# Patient Record
Sex: MALE | Race: WHITE | Employment: FULL TIME | URBAN - METROPOLITAN AREA
[De-identification: names, ages, dates, MRNs, and addresses within clinical notes are randomized per-mention and may not be internally consistent; named-entity substitution may affect disease eponyms.]

---

## 2020-12-27 ENCOUNTER — HOSPITAL ENCOUNTER (EMERGENCY)
Age: 45
Discharge: HOME | End: 2020-12-27
Attending: EMERGENCY MEDICINE

## 2020-12-27 ENCOUNTER — APPOINTMENT (OUTPATIENT)
Dept: GENERAL RADIOLOGY | Age: 45
End: 2020-12-27
Attending: EMERGENCY MEDICINE

## 2020-12-27 VITALS
RESPIRATION RATE: 18 BRPM | TEMPERATURE: 98.7 F | HEART RATE: 70 BPM | OXYGEN SATURATION: 99 % | SYSTOLIC BLOOD PRESSURE: 124 MMHG | DIASTOLIC BLOOD PRESSURE: 86 MMHG
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
## 2020-12-27 DIAGNOSIS — M25.561 ACUTE PAIN OF BOTH KNEES: Primary | ICD-10-CM

## 2020-12-27 DIAGNOSIS — M25.562 ACUTE PAIN OF BOTH KNEES: Primary | ICD-10-CM

## 2020-12-27 PROCEDURE — 73562 X-RAY EXAM OF KNEE 3: CPT

## 2020-12-27 PROCEDURE — 99282 EMERGENCY DEPT VISIT SF MDM: CPT

## 2020-12-27 RX ORDER — METHOCARBAMOL 750 MG/1
750 TABLET, FILM COATED ORAL
Qty: 15 TAB | Refills: 0 | Status: SHIPPED | OUTPATIENT
Start: 2020-12-27

## 2020-12-27 RX ORDER — OXYCODONE HYDROCHLORIDE 5 MG/1
5 TABLET ORAL
Qty: 6 TAB | Refills: 0 | Status: SHIPPED | OUTPATIENT
Start: 2020-12-27 | End: 2020-12-30

## 2020-12-28 NOTE — ED NOTES
Patient was provided with discharge instructions. Instructions and any medications were reviewed with the patient &/or family by Dr Katia Simms. Questions and concerns addressed by the provider. Patient discharged in stable condition via Amy, RN and walked out of the ED.

## 2020-12-28 NOTE — ED PROVIDER NOTES
EMERGENCY DEPARTMENT HISTORY AND PHYSICAL EXAM      Date: 12/27/2020  Patient Name: Trace Wheat III    History of Presenting Illness     Chief Complaint   Patient presents with    Knee Pain     Pt arrives via EMS c/o bilateral knee pain after jumping on a trampoline. Felt like the joints weren't in place and when he stood up, they popped back in. History Provided By: Patient    HPI: Callie Gerber, 41 y.o. male presents to the ED with cc of bilateral knee pain. Patient is a 78-year-old male with no significant past medical history he reports he was in his normal state of health, and was jumping on a trampoline. He reports he landed and both of his knees went out to the side. Reports he felt a \"pop\" and was able to straighten his legs after feeling like they were stuck. He reports pain in his medial knees bilaterally. Worse left greater than right. Reports swelling as well. Patient denies any other injuries, did not strike his head or lose consciousness. Otherwise in normal state of health. There are no other complaints, changes, or physical findings at this time. PCP: Jewelene Collet, MD    No current facility-administered medications on file prior to encounter. No current outpatient medications on file prior to encounter. Past History     Past Medical History:  Denies    Past Surgical History:  Denies    Family History:  Non-contributory    Social History:    Denies tobacco, alcohol or drug use    Allergies:    No known drug allergies    Review of Systems   Review of Systems   Constitutional: Negative for fever. HENT: Negative for voice change. Eyes: Negative for pain and redness. Respiratory: Negative for cough and chest tightness. Cardiovascular: Negative for chest pain and leg swelling. Gastrointestinal: Negative for abdominal pain, diarrhea, nausea and vomiting. Genitourinary: Negative for hematuria.    Musculoskeletal: Positive for arthralgias, gait problem and joint swelling. Skin: Negative for color change, pallor and rash. Neurological: Negative for facial asymmetry, weakness and headaches. Hematological: Does not bruise/bleed easily. Psychiatric/Behavioral: Negative for behavioral problems. All other systems reviewed and are negative. Physical Exam   Physical Exam  Vitals signs and nursing note reviewed. Constitutional:       Comments: 29-year-old male, resting on stretcher, no acute distress   HENT:      Head: Normocephalic. Right Ear: External ear normal.      Left Ear: External ear normal.      Nose: Nose normal.   Eyes:      Conjunctiva/sclera: Conjunctivae normal.   Cardiovascular:      Rate and Rhythm: Normal rate and regular rhythm. Comments: Patient has 2+ DP and PT pulses in bilateral lower extremities  Pulmonary:      Effort: Pulmonary effort is normal.      Breath sounds: Normal breath sounds. Abdominal:      Palpations: Abdomen is soft. Musculoskeletal: Normal range of motion. Comments: LLE: Patient is able to flex his knee to approximately 30 degrees. There is swelling and effusion most pronounced over the left proximal knee. There is no obvious ligamentous instability. RLE: Patient is able to flex his knee to approximately 30 degrees. There is swelling along the medial aspect of the knee joint, less pronounced than the left. Skin:     General: Skin is warm. Capillary Refill: Capillary refill takes less than 2 seconds. Neurological:      Mental Status: He is alert. Mental status is at baseline. Psychiatric:         Mood and Affect: Mood normal.         Behavior: Behavior normal.         Diagnostic Study Results     Labs -   No results found for this or any previous visit (from the past 12 hour(s)). Radiologic Studies -   XR KNEE LT 3 V   Final Result   IMPRESSION: Trace effusion. No acute osseous abnormality.       XR KNEE RT 3 V   Final Result   IMPRESSION: No acute abnormality. CT Results  (Last 48 hours)    None        CXR Results  (Last 48 hours)    None          Medical Decision Making   I am the first provider for this patient. I reviewed the vital signs, available nursing notes, past medical history, past surgical history, family history and social history. Vital Signs-Reviewed the patient's vital signs. Patient Vitals for the past 12 hrs:   Temp Pulse Resp BP SpO2   12/27/20 2053 98.7 °F (37.1 °C) 70 18 124/86 99 %         Records Reviewed: Nursing Notes and Old Medical Records    Provider Notes (Medical Decision Making):     Previously healthy 71-year-old male presents after an injury at [facility]. Vitals are stable. Patient denies any other injuries. Differential for patient's knee pain includes fracture, patellar dislocation, quadriceps tear, meniscal injury. Patient denies that his tibia went posteriorly, given his vascular status, I doubt that this is a spontaneously reduced knee dislocation. We will check bilateral plain films. Patient is declining pain medications. Discussed that he will likely need Ortho follow-up. ED Course:   Initial assessment performed. The patients presenting problems have been discussed, and they are in agreement with the care plan formulated and outlined with them. I have encouraged them to ask questions as they arise throughout their visit. ED Course as of Dec 27 2214   Sun Dec 27, 2020   2200 On further exam, of the left knee there is tenderness to palpation most pronounced along the medial quadriceps tendon with effusion. X-rays negative for fracture pressure transfusion. The right knee shows a defect along the medial aspect with some bruising over the left quadriceps muscle. Patient is neurovascular intact. Plain film without fracture. With extension patient has difficulty 2/2 pain.     [MB]      ED Course User Index  [MB] MD Feliciano Izaguirre, MD      Disposition:    Reassessments as above. Labs and ED course reviewed. Patient was discharged home and was provided strict return precautions. Patient to follow-up with PCP or specialist per discharge instructions or return to ED for worsening symptoms. DISCHARGE PLAN:  1. Current Discharge Medication List      START taking these medications    Details   oxyCODONE IR (Roxicodone) 5 mg immediate release tablet Take 1 Tab by mouth every six (6) hours as needed for Pain for up to 3 days. Max Daily Amount: 20 mg.  Qty: 6 Tab, Refills: 0    Associated Diagnoses: Acute pain of both knees      methocarbamoL (ROBAXIN) 750 mg tablet Take 1 Tab by mouth three (3) times daily as needed for Muscle Spasm(s). Qty: 15 Tab, Refills: 0           2. Follow-up Information     Follow up With Specialties Details Why Contact Info    Venus Patel MD Family Medicine In 3 days  [address] [facility]  [phone]      Tess Oscar MD Orthopedic Surgery, Hand Surgery  call for appointment [address]  [facility]  [phone]          3. Return to ED if worse     Diagnosis     Clinical Impression:   1. Acute pain of both knees    2. Activities involving trampoline        Attestations:    Bertram Baird MD    Please note that this dictation was completed with [employer], the computer voice recognition software. Quite often unanticipated grammatical, syntax, homophones, and other interpretive errors are inadvertently transcribed by the computer software. Please disregard these errors. Please excuse any errors that have escaped final proofreading. Thank you.

## 2020-12-28 NOTE — DISCHARGE INSTRUCTIONS
You were seen in the ER for your symptoms. Please use the knee immobilizer and crutches. Please use 2 Aleve twice a day as well as Tylenol. Please use the muscle relaxers or oxycodone for severe pain.     Return for worsening symptoms at any time

## 2023-05-17 RX ORDER — METHOCARBAMOL 750 MG/1
750 TABLET, FILM COATED ORAL 3 TIMES DAILY PRN
COMMUNITY
Start: 2020-12-27